# Patient Record
Sex: FEMALE | Race: WHITE | Employment: PART TIME | URBAN - METROPOLITAN AREA
[De-identification: names, ages, dates, MRNs, and addresses within clinical notes are randomized per-mention and may not be internally consistent; named-entity substitution may affect disease eponyms.]

---
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
## 2022-07-03 ENCOUNTER — HOSPITAL ENCOUNTER (OUTPATIENT)
Age: 22
Discharge: HOME | End: 2022-07-03
Attending: OBSTETRICS & GYNECOLOGY | Admitting: OBSTETRICS & GYNECOLOGY
Payer: COMMERCIAL

## 2022-07-03 VITALS
SYSTOLIC BLOOD PRESSURE: 98 MMHG | DIASTOLIC BLOOD PRESSURE: 59 MMHG | RESPIRATION RATE: 16 BRPM | HEIGHT: 65 IN | HEART RATE: 105 BPM | BODY MASS INDEX: 19.66 KG/M2 | TEMPERATURE: 98.2 F | WEIGHT: 118 LBS

## 2022-07-03 PROBLEM — O42.90 AMNIOTIC FLUID LEAKING: Status: ACTIVE | Noted: 2022-07-03

## 2022-07-03 LAB
AMNIOTEST, POC: NORMAL
Lab: NORMAL
NEGATIVE QC PASS/FAIL: NORMAL
POSITIVE QC PASS/FAIL: NORMAL

## 2022-07-03 PROCEDURE — 99211 OFF/OP EST MAY X REQ PHY/QHP: CPT

## 2022-07-03 PROCEDURE — 59025 FETAL NON-STRESS TEST: CPT

## 2022-07-03 NOTE — FLOWSHEET NOTE
Patient came to triage due to feeling like she is leaking amniotic fluid since last night. Denies pain and vaginal bleeding.

## 2022-07-03 NOTE — PROGRESS NOTES
Department of Obstetrics and Gynecology  Labor and Delivery Triage Note        CHIEF COMPLAINT:  Small amount of fluid/wetness since last night. HISTORY OF PRESENT ILLNESS:      The patient is a 25 y.o. female 37w6d. OB History        1    Para        Term                AB        Living           SAB        IAB        Ectopic        Molar        Multiple        Live Births                  Patient presents with a chief complaint as above. Pt denies vaginal bleeding. Estimated Due Date:  Estimated Date of Delivery: 22    PRENATAL CARE:    Complicated by: IUGR    REVIEW OF SYSTEMS:    CONSTITUTIONAL:  negative  EYES:  negative  HEENT:  negative  RESPIRATORY:  negative  CARDIOVASCULAR:  negative  GASTROINTESTINAL:  negative  GENITOURINARY:  negative  INTEGUMENT/BREAST:  negative  MUSCULOSKELETAL:  negative  BEHAVIOR/PSYCH:  negative    PHYSICAL EXAM:    Vitals:    22 1916   BP: (!) 98/59   Pulse: (!) 105   Resp: 16   Temp: 98.2 °F (36.8 °C)   TempSrc: Oral   Weight: 118 lb (53.5 kg)   Height: 5' 5\" (1.651 m)       Physical Examination: General appearance - alert, well appearing, and in no distress    Dilation (cm): Fingertip   Effacement: 0   Cervical Characteristics: Mid-Position   Station: -3        Contraction frequency:  0 minutes    Membranes:  Intact  SSE: nitrazene neg, neg pool  Lab Results   Component Value Date    WBC 7.5 11/15/2021    HGB 12.5 11/15/2021    HCT 37.4 11/15/2021     11/15/2021       ASSESSMENT AND PLAN:  Discharge Dx: The patient is a 25 y.o. female 37w5d. OB History        1    Para        Term                AB        Living           SAB        IAB        Ectopic        Molar        Multiple        Live Births                     Disposition:  Return to office as scheduled.   Medications:      Medication List      STOP taking these medications    ondansetron 4 MG disintegrating tablet  Commonly known as: Zofran ODT        ASK your doctor about these medications    PRENATAL VITAMIN PO           F/U Instructions: as needed

## 2022-07-12 ENCOUNTER — ANESTHESIA EVENT (OUTPATIENT)
Dept: LABOR AND DELIVERY | Age: 22
End: 2022-07-12
Payer: COMMERCIAL

## 2022-07-12 ENCOUNTER — ANESTHESIA (OUTPATIENT)
Dept: LABOR AND DELIVERY | Age: 22
End: 2022-07-12
Payer: COMMERCIAL

## 2022-07-12 ENCOUNTER — ANESTHESIA EVENT (OUTPATIENT)
Dept: LABOR AND DELIVERY | Age: 22
End: 2022-07-12

## 2022-07-12 ENCOUNTER — HOSPITAL ENCOUNTER (INPATIENT)
Age: 22
LOS: 2 days | Discharge: HOME | End: 2022-07-14
Attending: OBSTETRICS & GYNECOLOGY | Admitting: OBSTETRICS & GYNECOLOGY
Payer: COMMERCIAL

## 2022-07-12 ENCOUNTER — ANESTHESIA (OUTPATIENT)
Dept: LABOR AND DELIVERY | Age: 22
End: 2022-07-12

## 2022-07-12 PROBLEM — Z34.90 ENCOUNTER FOR INDUCTION OF LABOR: Status: ACTIVE | Noted: 2022-07-12

## 2022-07-12 LAB
ABO/RH: NORMAL
AMPHETAMINE SCREEN, URINE: NORMAL
ANTIBODY SCREEN: NORMAL
BARBITURATE SCREEN URINE: NORMAL
BASOPHILS ABSOLUTE: 0 K/UL (ref 0–0.2)
BASOPHILS RELATIVE PERCENT: 0.2 %
BENZODIAZEPINE SCREEN, URINE: NORMAL
BUPRENORPHINE URINE: NORMAL
CANNABINOID SCREEN URINE: NORMAL
COCAINE METABOLITE SCREEN URINE: NORMAL
EOSINOPHILS ABSOLUTE: 0.1 K/UL (ref 0–0.6)
EOSINOPHILS RELATIVE PERCENT: 1 %
HCT VFR BLD CALC: 32.6 % (ref 36–48)
HEMOGLOBIN: 11.1 G/DL (ref 12–16)
LYMPHOCYTES ABSOLUTE: 2.5 K/UL (ref 1–5.1)
LYMPHOCYTES RELATIVE PERCENT: 17.9 %
Lab: NORMAL
MCH RBC QN AUTO: 31.8 PG (ref 26–34)
MCHC RBC AUTO-ENTMCNC: 34.1 G/DL (ref 31–36)
MCV RBC AUTO: 93.1 FL (ref 80–100)
METHADONE SCREEN, URINE: NORMAL
MONOCYTES ABSOLUTE: 1 K/UL (ref 0–1.3)
MONOCYTES RELATIVE PERCENT: 7.2 %
NEUTROPHILS ABSOLUTE: 10.4 K/UL (ref 1.7–7.7)
NEUTROPHILS RELATIVE PERCENT: 73.7 %
OPIATE SCREEN URINE: NORMAL
OXYCODONE URINE: NORMAL
PDW BLD-RTO: 13.7 % (ref 12.4–15.4)
PH UA: 5
PHENCYCLIDINE SCREEN URINE: NORMAL
PLATELET # BLD: 186 K/UL (ref 135–450)
PMV BLD AUTO: 10.3 FL (ref 5–10.5)
PROPOXYPHENE SCREEN: NORMAL
RBC # BLD: 3.5 M/UL (ref 4–5.2)
SARS-COV-2, NAAT: NOT DETECTED
WBC # BLD: 14 K/UL (ref 4–11)

## 2022-07-12 PROCEDURE — 6360000002 HC RX W HCPCS: Performed by: OBSTETRICS & GYNECOLOGY

## 2022-07-12 PROCEDURE — 6370000000 HC RX 637 (ALT 250 FOR IP): Performed by: OBSTETRICS & GYNECOLOGY

## 2022-07-12 PROCEDURE — 85025 COMPLETE CBC W/AUTO DIFF WBC: CPT

## 2022-07-12 PROCEDURE — 2500000003 HC RX 250 WO HCPCS: Performed by: NURSE ANESTHETIST, CERTIFIED REGISTERED

## 2022-07-12 PROCEDURE — 2580000003 HC RX 258: Performed by: OBSTETRICS & GYNECOLOGY

## 2022-07-12 PROCEDURE — 86901 BLOOD TYPING SEROLOGIC RH(D): CPT

## 2022-07-12 PROCEDURE — 3700000025 EPIDURAL BLOCK: Performed by: ANESTHESIOLOGY

## 2022-07-12 PROCEDURE — 86850 RBC ANTIBODY SCREEN: CPT

## 2022-07-12 PROCEDURE — 87635 SARS-COV-2 COVID-19 AMP PRB: CPT

## 2022-07-12 PROCEDURE — 1220000000 HC SEMI PRIVATE OB R&B

## 2022-07-12 PROCEDURE — 86900 BLOOD TYPING SEROLOGIC ABO: CPT

## 2022-07-12 PROCEDURE — 86780 TREPONEMA PALLIDUM: CPT

## 2022-07-12 PROCEDURE — 80307 DRUG TEST PRSMV CHEM ANLYZR: CPT

## 2022-07-12 PROCEDURE — 6360000002 HC RX W HCPCS: Performed by: ANESTHESIOLOGY

## 2022-07-12 RX ORDER — LIDOCAINE HYDROCHLORIDE AND EPINEPHRINE 15; 5 MG/ML; UG/ML
INJECTION, SOLUTION EPIDURAL PRN
Status: DISCONTINUED | OUTPATIENT
Start: 2022-07-12 | End: 2022-07-13 | Stop reason: SDUPTHER

## 2022-07-12 RX ORDER — SODIUM CHLORIDE, SODIUM LACTATE, POTASSIUM CHLORIDE, AND CALCIUM CHLORIDE .6; .31; .03; .02 G/100ML; G/100ML; G/100ML; G/100ML
500 INJECTION, SOLUTION INTRAVENOUS PRN
Status: DISCONTINUED | OUTPATIENT
Start: 2022-07-12 | End: 2022-07-13

## 2022-07-12 RX ORDER — SODIUM CHLORIDE 9 MG/ML
25 INJECTION, SOLUTION INTRAVENOUS PRN
Status: DISCONTINUED | OUTPATIENT
Start: 2022-07-12 | End: 2022-07-13

## 2022-07-12 RX ORDER — SODIUM CHLORIDE, SODIUM LACTATE, POTASSIUM CHLORIDE, CALCIUM CHLORIDE 600; 310; 30; 20 MG/100ML; MG/100ML; MG/100ML; MG/100ML
INJECTION, SOLUTION INTRAVENOUS CONTINUOUS
Status: DISCONTINUED | OUTPATIENT
Start: 2022-07-12 | End: 2022-07-13

## 2022-07-12 RX ORDER — NALOXONE HYDROCHLORIDE 0.4 MG/ML
INJECTION, SOLUTION INTRAMUSCULAR; INTRAVENOUS; SUBCUTANEOUS PRN
Status: DISCONTINUED | OUTPATIENT
Start: 2022-07-12 | End: 2022-07-13 | Stop reason: HOSPADM

## 2022-07-12 RX ORDER — SODIUM CHLORIDE 0.9 % (FLUSH) 0.9 %
5-40 SYRINGE (ML) INJECTION EVERY 12 HOURS SCHEDULED
Status: DISCONTINUED | OUTPATIENT
Start: 2022-07-12 | End: 2022-07-13

## 2022-07-12 RX ORDER — SODIUM CHLORIDE 0.9 % (FLUSH) 0.9 %
5-40 SYRINGE (ML) INJECTION PRN
Status: DISCONTINUED | OUTPATIENT
Start: 2022-07-12 | End: 2022-07-13

## 2022-07-12 RX ORDER — ONDANSETRON 2 MG/ML
4 INJECTION INTRAMUSCULAR; INTRAVENOUS EVERY 6 HOURS PRN
Status: DISCONTINUED | OUTPATIENT
Start: 2022-07-12 | End: 2022-07-13

## 2022-07-12 RX ORDER — FENTANYL/BUPIVACAINE/NS/PF 2-1250MCG
PLASTIC BAG, INJECTION (ML) INJECTION
Status: COMPLETED
Start: 2022-07-12 | End: 2022-07-12

## 2022-07-12 RX ORDER — FENTANYL/BUPIVACAINE/NS/PF 2-1250MCG
12 PLASTIC BAG, INJECTION (ML) INJECTION CONTINUOUS
Status: DISCONTINUED | OUTPATIENT
Start: 2022-07-12 | End: 2022-07-13

## 2022-07-12 RX ORDER — SODIUM CHLORIDE, SODIUM LACTATE, POTASSIUM CHLORIDE, AND CALCIUM CHLORIDE .6; .31; .03; .02 G/100ML; G/100ML; G/100ML; G/100ML
1000 INJECTION, SOLUTION INTRAVENOUS PRN
Status: DISCONTINUED | OUTPATIENT
Start: 2022-07-12 | End: 2022-07-13

## 2022-07-12 RX ADMIN — SODIUM CHLORIDE, POTASSIUM CHLORIDE, SODIUM LACTATE AND CALCIUM CHLORIDE 1000 ML: 600; 310; 30; 20 INJECTION, SOLUTION INTRAVENOUS at 23:02

## 2022-07-12 RX ADMIN — Medication 12 ML/HR: at 23:15

## 2022-07-12 RX ADMIN — Medication 25 MCG: at 09:41

## 2022-07-12 RX ADMIN — SODIUM CHLORIDE, POTASSIUM CHLORIDE, SODIUM LACTATE AND CALCIUM CHLORIDE: 600; 310; 30; 20 INJECTION, SOLUTION INTRAVENOUS at 14:54

## 2022-07-12 RX ADMIN — Medication 1 MILLI-UNITS/MIN: at 15:01

## 2022-07-12 RX ADMIN — SODIUM CHLORIDE, POTASSIUM CHLORIDE, SODIUM LACTATE AND CALCIUM CHLORIDE: 600; 310; 30; 20 INJECTION, SOLUTION INTRAVENOUS at 09:36

## 2022-07-12 RX ADMIN — SODIUM CHLORIDE, POTASSIUM CHLORIDE, SODIUM LACTATE AND CALCIUM CHLORIDE: 600; 310; 30; 20 INJECTION, SOLUTION INTRAVENOUS at 23:23

## 2022-07-12 RX ADMIN — LIDOCAINE HYDROCHLORIDE AND EPINEPHRINE 5 ML: 15; 5 INJECTION, SOLUTION EPIDURAL at 23:12

## 2022-07-12 ASSESSMENT — PAIN DESCRIPTION - DESCRIPTORS
DESCRIPTORS: CRAMPING
DESCRIPTORS: DISCOMFORT
DESCRIPTORS: DISCOMFORT
DESCRIPTORS: CRAMPING

## 2022-07-12 NOTE — ANESTHESIA PRE PROCEDURE
Department of Anesthesiology  Preprocedure Note       Name:  Katie Harkins   Age:  25 y.o.  :  2000                                          MRN:  [id number]         Date:  2022      Surgeon: * No surgeons listed *    Procedure: * No procedures listed *    Medications prior to admission:   Prior to Admission medications    Medication Sig Start Date End Date Taking? Authorizing Provider   Prenatal Vit-Fe Fumarate-FA (PRENATAL VITAMIN PO) Take 2 tablets by mouth    Historical Provider, MD       Current medications:    Current Facility-Administered Medications   Medication Dose Route Frequency Provider Last Rate Last Admin    lactated ringers infusion   IntraVENous Continuous Noreen Pettit  mL/hr at 22 0936 New Bag at 22 0936    lactated ringers bolus  500 mL IntraVENous PRN Noreen Pettit MD        Or    lactated ringers bolus  1,000 mL IntraVENous PRN Noreen Pettit MD        sodium chloride flush 0.9 % injection 5-40 mL  5-40 mL IntraVENous 2 times per day Noreen Pettit MD        sodium chloride flush 0.9 % injection 5-40 mL  5-40 mL IntraVENous PRN Noreen Pettit MD        0.9 % sodium chloride infusion  25 mL IntraVENous PRN Noreen Pettit MD        oxytocin (PITOCIN) 30 units in 500 mL infusion  87.3 kamran-units/min IntraVENous Continuous PRN Noreen Pettit MD        And    oxytocin (PITOCIN) 10 unit bolus from the bag  10 Units IntraVENous PRROX Pettit MD        ondansetron [facility]) injection 4 mg  4 mg IntraVENous Q6H PRN Noreen Pettit MD        miSOPROStol (CYTOTEC) pre-split tablet TABS 25 mcg  25 mcg Oral Q4H Noreen Pettit MD   25 mcg at 22 0941       Allergies:  No Known Allergies    Problem List:    Patient Active Problem List   Diagnosis Code    Amniotic fluid leaking O42.90    Encounter for induction of labor Z34.90       Past Medical History:  History reviewed. No pertinent past medical history.     Past Surgical History:  History reviewed. No pertinent surgical history. Social History:    Social History     Tobacco Use    Smoking status: Light Tobacco Smoker    Smokeless tobacco: Never Used   Substance Use Topics    Alcohol use: Never                                Ready to quit: Not Answered  Counseling given: Not Answered      Vital Signs (Current):   Vitals:    07/12/22 0852 07/12/22 0901 07/12/22 0941 07/12/22 0958   BP: (!) 102/58  (!) 104/57 98/60   Pulse: 85  78 88   Resp: 16  16 17   Temp: 36.8 °C (98.2 °F)  36.4 °C (97.5 °F)    TempSrc: Oral  Oral    SpO2:  99%     Weight:       Height:                                                  BP Readings from Last 3 Encounters:   07/12/22 98/60   07/03/22 (!) 98/59   01/13/20 123/78 (89 %, Z = 1.23 /  92 %, Z = 1.41)*     *BP percentiles are based on the 2017 AAP Clinical Practice Guideline for girls       NPO Status:                                                                                 BMI:   Wt Readings from Last 3 Encounters:   07/12/22 121 lb (54.9 kg) (36 %, Z= -0.36)*   07/03/22 118 lb (53.5 kg) (30 %, Z= -0.53)*   01/13/20 115 lb (52.2 kg) (34 %, Z= -0.41)*     * Growth percentiles are based on CDC (Girls, 2-20 Years) data. Body mass index is 20.14 kg/m². CBC:   Lab Results   Component Value Date/Time    WBC 14.0 07/12/2022 09:25 AM    RBC 3.50 07/12/2022 09:25 AM    HGB 11.1 07/12/2022 09:25 AM    HCT 32.6 07/12/2022 09:25 AM    MCV 93.1 07/12/2022 09:25 AM    RDW 13.7 07/12/2022 09:25 AM     07/12/2022 09:25 AM       CMP: No results found for: NA, K, CL, CO2, BUN, CREATININE, GFRAA, AGRATIO, LABGLOM, GLUCOSE, GLU, PROT, CALCIUM, BILITOT, ALKPHOS, AST, ALT    POC Tests: No results for input(s): POCGLU, POCNA, POCK, POCCL, POCBUN, POCHEMO, POCHCT in the last 72 hours.     Coags: No results found for: PROTIME, INR, APTT    HCG (If Applicable): No results found for: PREGTESTUR, PREGSERUM, HCG, HCGQUANT     ABGs: No results found for: PHART, PO2ART, RRV5OOS, RTM2LDE, BEART, K0DCQMTD     Type & Screen (If Applicable):  No results found for: LABABO, LABRH    Drug/Infectious Status (If Applicable):  No results found for: HIV, HEPCAB    COVID-19 Screening (If Applicable): No results found for: COVID19        Anesthesia Evaluation  Patient summary reviewed and Nursing notes reviewed  Airway: Mallampati: I  TM distance: >3 FB   Neck ROM: full  Mouth opening: > = 3 FB   Dental: normal exam         Pulmonary:normal exam  breath sounds clear to auscultation                             Cardiovascular:Negative CV ROS  Exercise tolerance: good (>4 METS),         NYHA Classification: I    Rhythm: regular  Rate: normal           Beta Blocker:  Not on Beta Blocker         Neuro/Psych:   Negative Neuro/Psych ROS              GI/Hepatic/Renal: Neg GI/Hepatic/Renal ROS            Endo/Other: Negative Endo/Other ROS                    Abdominal:             Vascular: negative vascular ROS. Other Findings:           Anesthesia Plan      epidural     ASA 2             Anesthetic plan and risks discussed with patient. Use of blood products discussed with patient whom consented to blood products. Plan discussed with attending. Patient request pain control for labor and delivery. Discussed procedure (epidural,spinal, general and non regional options) and  options. Alternatives, benefits, risks, including but not limited to changes in VSS, allergic reaction, infection, intravascular injection, paresthesia, n/v, severe headache, temporary or permanent rare neurologic sequelae, and failed block. All questions answered and states understanding. Patient agrees to proceed. Choice of anesthetic will be determined by clinical condition at the time of anesthesia initiation.         CLARENCE Márquez - CRNA   2022

## 2022-07-13 LAB — TOTAL SYPHILLIS IGG/IGM: NORMAL

## 2022-07-13 PROCEDURE — 0UQMXZZ REPAIR VULVA, EXTERNAL APPROACH: ICD-10-PCS | Performed by: OBSTETRICS & GYNECOLOGY

## 2022-07-13 PROCEDURE — 6360000002 HC RX W HCPCS: Performed by: OBSTETRICS & GYNECOLOGY

## 2022-07-13 PROCEDURE — 6370000000 HC RX 637 (ALT 250 FOR IP): Performed by: OBSTETRICS & GYNECOLOGY

## 2022-07-13 PROCEDURE — 3E0P7VZ INTRODUCTION OF HORMONE INTO FEMALE REPRODUCTIVE, VIA NATURAL OR ARTIFICIAL OPENING: ICD-10-PCS | Performed by: OBSTETRICS & GYNECOLOGY

## 2022-07-13 PROCEDURE — 3E033VJ INTRODUCTION OF OTHER HORMONE INTO PERIPHERAL VEIN, PERCUTANEOUS APPROACH: ICD-10-PCS | Performed by: OBSTETRICS & GYNECOLOGY

## 2022-07-13 PROCEDURE — 10907ZC DRAINAGE OF AMNIOTIC FLUID, THERAPEUTIC FROM PRODUCTS OF CONCEPTION, VIA NATURAL OR ARTIFICIAL OPENING: ICD-10-PCS | Performed by: OBSTETRICS & GYNECOLOGY

## 2022-07-13 PROCEDURE — 1220000000 HC SEMI PRIVATE OB R&B

## 2022-07-13 PROCEDURE — 6360000002 HC RX W HCPCS: Performed by: ANESTHESIOLOGY

## 2022-07-13 PROCEDURE — 7200000001 HC VAGINAL DELIVERY

## 2022-07-13 RX ORDER — OXYCODONE HYDROCHLORIDE 5 MG/1
5 TABLET ORAL EVERY 6 HOURS PRN
Qty: 10 TABLET | Refills: 0 | Status: SHIPPED | OUTPATIENT
Start: 2022-07-13 | End: 2022-07-18

## 2022-07-13 RX ORDER — OXYCODONE HYDROCHLORIDE 5 MG/1
5 TABLET ORAL EVERY 4 HOURS PRN
Status: DISCONTINUED | OUTPATIENT
Start: 2022-07-13 | End: 2022-07-14 | Stop reason: HOSPADM

## 2022-07-13 RX ORDER — SODIUM CHLORIDE 9 MG/ML
INJECTION, SOLUTION INTRAVENOUS PRN
Status: DISCONTINUED | OUTPATIENT
Start: 2022-07-13 | End: 2022-07-14 | Stop reason: HOSPADM

## 2022-07-13 RX ORDER — MODIFIED LANOLIN
OINTMENT (GRAM) TOPICAL PRN
Status: DISCONTINUED | OUTPATIENT
Start: 2022-07-13 | End: 2022-07-14 | Stop reason: HOSPADM

## 2022-07-13 RX ORDER — IBUPROFEN 600 MG/1
600 TABLET ORAL EVERY 8 HOURS PRN
Qty: 30 TABLET | Refills: 1 | Status: SHIPPED | OUTPATIENT
Start: 2022-07-13

## 2022-07-13 RX ORDER — ACETAMINOPHEN 500 MG
1000 TABLET ORAL EVERY 6 HOURS PRN
Qty: 30 TABLET | Refills: 0 | Status: SHIPPED | OUTPATIENT
Start: 2022-07-13

## 2022-07-13 RX ORDER — IBUPROFEN 400 MG/1
400 TABLET ORAL EVERY 6 HOURS PRN
Status: DISCONTINUED | OUTPATIENT
Start: 2022-07-13 | End: 2022-07-14 | Stop reason: HOSPADM

## 2022-07-13 RX ORDER — SODIUM CHLORIDE 0.9 % (FLUSH) 0.9 %
5-40 SYRINGE (ML) INJECTION PRN
Status: DISCONTINUED | OUTPATIENT
Start: 2022-07-13 | End: 2022-07-14 | Stop reason: HOSPADM

## 2022-07-13 RX ORDER — SODIUM CHLORIDE 0.9 % (FLUSH) 0.9 %
5-40 SYRINGE (ML) INJECTION EVERY 12 HOURS SCHEDULED
Status: DISCONTINUED | OUTPATIENT
Start: 2022-07-13 | End: 2022-07-14 | Stop reason: HOSPADM

## 2022-07-13 RX ORDER — OXYCODONE HYDROCHLORIDE 5 MG/1
10 TABLET ORAL EVERY 4 HOURS PRN
Status: DISCONTINUED | OUTPATIENT
Start: 2022-07-13 | End: 2022-07-14 | Stop reason: HOSPADM

## 2022-07-13 RX ORDER — ACETAMINOPHEN 500 MG
1000 TABLET ORAL EVERY 8 HOURS
Status: DISCONTINUED | OUTPATIENT
Start: 2022-07-13 | End: 2022-07-14 | Stop reason: HOSPADM

## 2022-07-13 RX ORDER — DOCUSATE SODIUM 100 MG/1
100 CAPSULE, LIQUID FILLED ORAL 2 TIMES DAILY
Status: DISCONTINUED | OUTPATIENT
Start: 2022-07-13 | End: 2022-07-14 | Stop reason: HOSPADM

## 2022-07-13 RX ADMIN — ACETAMINOPHEN 1000 MG: 500 TABLET ORAL at 13:52

## 2022-07-13 RX ADMIN — DOCUSATE SODIUM 100 MG: 100 CAPSULE, LIQUID FILLED ORAL at 20:06

## 2022-07-13 RX ADMIN — ACETAMINOPHEN 1000 MG: 500 TABLET ORAL at 21:51

## 2022-07-13 RX ADMIN — DOCUSATE SODIUM 100 MG: 100 CAPSULE, LIQUID FILLED ORAL at 08:51

## 2022-07-13 RX ADMIN — Medication 87.3 MILLI-UNITS/MIN: at 03:00

## 2022-07-13 RX ADMIN — Medication 10 ML: at 02:57

## 2022-07-13 RX ADMIN — IBUPROFEN 400 MG: 400 TABLET, FILM COATED ORAL at 08:47

## 2022-07-13 RX ADMIN — Medication 166.7 ML: at 03:01

## 2022-07-13 ASSESSMENT — PAIN DESCRIPTION - LOCATION: LOCATION: PERINEUM

## 2022-07-13 ASSESSMENT — PAIN SCALES - GENERAL
PAINLEVEL_OUTOF10: 3
PAINLEVEL_OUTOF10: 2
PAINLEVEL_OUTOF10: 0

## 2022-07-13 NOTE — DISCHARGE SUMMARY
Obstetrical Discharge Form    Gestational Age: 36w3d    Antepartum complications: intrauterine growth restriction    Date of Delivery: 22      Type of Delivery: vaginal, spontaneous    Delivered By: Lucy Seth     Baby:      Information for the patient's :  Chavez Alcocer [[id number]]   APGAR One: 9     Information for the patient's :  Olive Liner [[id number]]   APGAR Five: 9     Information for the patient's :  Olive Liner [[id number]]   Birth Weight: 5 lb 14.2 oz (2.67 kg)       Anesthesia: Epidural    Intrapartum complications: None    Postpartum complications: none    Discharge Medication:      Medication List      ASK your doctor about these medications    PRENATAL VITAMIN PO            Discharge Condition:  good    Discharge Date: 22    PLAN:  Follow up in 6 weeks for routine PP visit  All questions answered  D/C summary begun at delivery for D/C planning purposes, any delay in discharge from ordered D/C date due to  factors.

## 2022-07-13 NOTE — ANESTHESIA POSTPROCEDURE EVALUATION
Department of Anesthesiology  Postprocedure Note    Patient: Geronimo Zayas  MRN: [id number]  YOB: 2000  Date of evaluation: 7/13/2022      Procedure Summary     Date: 07/12/22 Room / Location:     Anesthesia Start: 2252 Anesthesia Stop: 07/13/22 0257    Procedure: Labor Analgesia Diagnosis:     Scheduled Providers:  Responsible Provider: Lucero Ruff MD    Anesthesia Type: epidural ASA Status: 2          Anesthesia Type: No value filed. Amie Phase I: Amie Score: 10    Amie Phase II: Amie Score: 10      Anesthesia Post Evaluation    Patient location during evaluation: floor  Patient participation: complete - patient participated  Level of consciousness: awake and alert  Pain score: 0  Airway patency: patent  Nausea & Vomiting: no vomiting and no nausea  Complications: no  Cardiovascular status: hemodynamically stable  Respiratory status: acceptable  Hydration status: stable  Comments: Patient s/p epidural for L&D. Pt denies residual numbness post block. Patient is ambulating and voiding without difficulty. Patient denies back pain, headache, paresthesias, n/v or pruritus. Epidural site is free of signs of infection.

## 2022-07-13 NOTE — PROGRESS NOTES
0.0 07/12/2022 09:25 AM     WBC:    Lab Results   Component Value Date/Time    WBC 14.0 07/12/2022 09:25 AM     Platelets:    Lab Results   Component Value Date/Time     07/12/2022 09:25 AM     Hemoglobin/Hematocrit:    Lab Results   Component Value Date/Time    HGB 11.1 07/12/2022 09:25 AM    HCT 32.6 07/12/2022 09:25 AM         Assessment:     Status post vaginal delivery. The patient is doing well, although delivery was complicated by a vaginal laceration repaired with stitching. Plan:     Continue current care. The patient can continue Nsaids to control any cramping at this point, and should watch for any changes in bleeding or worsening vaginal pain. She should make an appointment to f/u in 4-6 weeks as directed.

## 2022-07-13 NOTE — L&D DELIVERY SUMMARY NOTE
Labor & Delivery Summary  Labor Onset Date: 22  Labor Onset Time: 2315  Dilation Complete Date: 22  Dilation Complete Time: 0155  OB Anesthesia Type: Epidural  Induction: Prostaglandins  Augmentation: AROM    Pre-operative Diagnosis:  Term pregnancy and Induced labor    Post-operative Diagnosis:  Living  infant,  Male    Procedure:  Spontaneous vaginal delivery or Repair right labial spontaneous laceration    Surgeon:   Dr. Paula Kramer for the patient's :  [place] Brenner [[id number]]          Anesthesia:  epidural anesthesia    Estimated blood loss:  300ml    Specimen:  Placenta not sent to pathology     Cord blood sent No    Complications:  none    Condition:  infant stable to general nursery    Details of Procedure: The patient is a 25 y.o. female at 36w3d   OB History        1    Para   1    Term   1            AB        Living   1       SAB        IAB        Ectopic        Molar        Multiple   0    Live Births   1             who was admitted for induction. She received the following interventions: Cytotec x1, ARBOW and IV Pitocin induction The patient progressed well,did receive an epidural, became complete and started to push. After pushing for 10 min  the fetal head was at the perineum, nose and mouth suctioned with bulb suction and the rest of the infant delivered atraumatically, placed on mother abdomen. Cord was clamped and cut and infant handed off to the waiting nurse for evaluation. The placenta was delivered byspontaneous. The perineum and vagina were explored and right labial laceration was repaired in standard fashion.

## 2022-07-13 NOTE — ANESTHESIA PROCEDURE NOTES
Epidural Block    Patient location during procedure: OB  Start time: 2022 10:52 PM  End time: 2022 11:12 PM  Reason for block: labor epidural  Staffing  Performed: resident/CRNA   Anesthesiologist: Pranav Holland MD  Resident/CRNA: CLARENCE Musa - CRNA  Epidural  Patient position: sitting  Prep: ChloraPrep and site prepped and draped  Patient monitoring: continuous pulse ox and frequent blood pressure checks  Approach: midline  Location: L3-4  Injection technique: CARSON saline  Provider prep: mask and sterile gloves  Needle  Needle type: Tuohy   Needle gauge: 17 G  Needle length: 3.5 in  Needle insertion depth: 7 cm  Catheter type: multi-orifice  Catheter size: 19 G  Catheter at skin depth: 12 cm  Test dose: negativeCatheter Secured: tegaderm and tape  Assessment  Sensory level: T10  Hemodynamics: stable  Attempts: 1  Outcomes: uncomplicated and patient tolerated procedure well  Additional Notes  Procedure(labor epidural):    Called at 2252 for labor epidural analgesia request. Patient identified, informed consent obtained, and timeout performed. Medical and Surgical history reviewed with pt. Risks/benefits/alternatives of epidural discussed including allergic reaction, infection, bleeding, hypotension, headache, back pain, nerve damage, failed or one-sided block. Also discussed anesthesia options and associated risks in the event of . All questions answered. Verbalizes understanding and requests to proceed. VSS:  Stable throughout      Pt in sitting position. Labor epidural placed using CARSON sterile technique (donned mask, hat, and sterile gloves). Back prepped with Chloraprep x 2. Sterile drape applied. Site: L3-4  CARSON:  7cm. Attempts:  1  Re-directs: 0  Site infiltrated with 3ml 1%Lidocaine(25g). 17G Tuohy needle inserted, CARSON technique with saline. Epidural space dilated with saline. Threaded spring wound epidural catheter through Tuohy needle easily.  No heme, CSF, pain with injection, or paresthesias noted. Tuohy needle withdrawn. Test Dose: 2312 Negative aspiration or pain with injection. 5ml of 1.5% Lido with epi 1:200,000 test dose given. Negative test dose. Skin:  12cm catheter taped at the skin. Secured with steri strips, tegaderm, and tape. Bolus Dose: 10ml of 0.125% bupivacaine with Fentanyl (2ug/ml). Negative aspiration or pain with injection. Given in 2ml increments. Infusion: 0.125% bupivacaine with Fentanyl (2ug/ml)  Auto bolus 4ml every 20 minutes. (Max. Dose- 40 ml/hr.)      Sensory Level:  R:  T10  L:  T10    Motor: 4/5  VAS: start 8/10, end 0/10. Stated comfort and acceptable to patient. Patient in supine/HOB position with left uterine displacement. VSS. Procedure un complicated and patient tolerated it without complaints.    Preanesthetic Checklist  Completed: patient identified, IV checked, site marked, risks and benefits discussed, surgical/procedural consents, equipment checked, pre-op evaluation, timeout performed, anesthesia consent given, oxygen available, monitors applied/VS acknowledged, fire risk safety assessment completed and verbalized and blood product R/B/A discussed and consented

## 2022-07-14 VITALS
HEART RATE: 79 BPM | SYSTOLIC BLOOD PRESSURE: 101 MMHG | DIASTOLIC BLOOD PRESSURE: 62 MMHG | OXYGEN SATURATION: 98 % | BODY MASS INDEX: 20.16 KG/M2 | TEMPERATURE: 97.9 F | WEIGHT: 121 LBS | HEIGHT: 65 IN | RESPIRATION RATE: 16 BRPM

## 2022-07-14 PROCEDURE — 6370000000 HC RX 637 (ALT 250 FOR IP): Performed by: OBSTETRICS & GYNECOLOGY

## 2022-07-14 RX ADMIN — ACETAMINOPHEN 1000 MG: 500 TABLET ORAL at 06:04

## 2022-07-14 RX ADMIN — IBUPROFEN 400 MG: 400 TABLET, FILM COATED ORAL at 09:53

## 2022-07-14 RX ADMIN — DOCUSATE SODIUM 100 MG: 100 CAPSULE, LIQUID FILLED ORAL at 09:54

## 2022-07-14 RX ADMIN — ACETAMINOPHEN 1000 MG: 500 TABLET ORAL at 14:22

## 2022-07-14 ASSESSMENT — PAIN DESCRIPTION - LOCATION
LOCATION: PERINEUM
LOCATION: PERINEUM

## 2022-07-14 ASSESSMENT — PAIN DESCRIPTION - DESCRIPTORS
DESCRIPTORS: DISCOMFORT
DESCRIPTORS: DISCOMFORT

## 2022-07-14 ASSESSMENT — PAIN SCALES - GENERAL
PAINLEVEL_OUTOF10: 0
PAINLEVEL_OUTOF10: 3
PAINLEVEL_OUTOF10: 4

## 2022-07-14 NOTE — PROGRESS NOTES
Subjective:     Postpartum Day 2: Vaginal Delivery     The patient feels well. Pain is well controlled with current medications. The baby is well. Baby is feeding via breast and the baby is latching on well. Urinary output is adequate with no changes in urination. The patient is ambulating well. The patient is tolerating a normal diet. Flatus has been passed. The patient notes mild bleeding and vaginal pain that is improved from yesterday. She had a vaginal laceration during delivery. The patient denies new onset headaches, vision changes, hearing changes, CP, SOB, heart palpitations, n/v/d, changes in urination, and swelling.     Objective:      Patient Vitals for the past 8 hrs:   BP Temp Temp src Pulse   7/14/2022 0130 115/71 98.3 °F (36.8 °C) Oral 89     General:    alert, appears stated age and cooperative   Cardiovascular RRR   Pulmonary CTAB   Uterine Fundus:   firm   Skin No lower extremity edema   DVT Evaluation:  No evidence of DVT seen on physical exam.       CBC:   Lab Results   Component Value Date/Time    WBC 14.0 07/12/2022 09:25 AM    RBC 3.50 07/12/2022 09:25 AM    HGB 11.1 07/12/2022 09:25 AM    HCT 32.6 07/12/2022 09:25 AM    MCV 93.1 07/12/2022 09:25 AM    MCH 31.8 07/12/2022 09:25 AM    MCHC 34.1 07/12/2022 09:25 AM    RDW 13.7 07/12/2022 09:25 AM     07/12/2022 09:25 AM    MPV 10.3 07/12/2022 09:25 AM     CBC with Differential:    Lab Results   Component Value Date/Time    WBC 14.0 07/12/2022 09:25 AM    RBC 3.50 07/12/2022 09:25 AM    HGB 11.1 07/12/2022 09:25 AM    HCT 32.6 07/12/2022 09:25 AM     07/12/2022 09:25 AM    MCV 93.1 07/12/2022 09:25 AM    MCH 31.8 07/12/2022 09:25 AM    MCHC 34.1 07/12/2022 09:25 AM    RDW 13.7 07/12/2022 09:25 AM    LYMPHOPCT 17.9 07/12/2022 09:25 AM    MONOPCT 7.2 07/12/2022 09:25 AM    BASOPCT 0.2 07/12/2022 09:25 AM    MONOSABS 1.0 07/12/2022 09:25 AM    LYMPHSABS 2.5 07/12/2022 09:25 AM    EOSABS 0.1 07/12/2022 09:25 AM    BASOSABS 0.0 07/12/2022 09:25 AM     WBC:    Lab Results   Component Value Date/Time    WBC 14.0 07/12/2022 09:25 AM     Platelets:    Lab Results   Component Value Date/Time     07/12/2022 09:25 AM     Hemoglobin/Hematocrit:    Lab Results   Component Value Date/Time    HGB 11.1 07/12/2022 09:25 AM    HCT 32.6 07/12/2022 09:25 AM         Assessment:     Status post vaginal delivery. The patient is doing well, although delivery was complicated by a vaginal laceration repaired with stitching. Plan:     Continue current care. The patient can continue Nsaids to control any cramping at this point, and should watch for any changes in bleeding or worsening vaginal pain. She should make an appointment to f/u in 4-6 weeks as directed.

## 2023-12-28 ENCOUNTER — OFFICE VISIT (OUTPATIENT)
Age: 23
End: 2023-12-28

## 2023-12-28 VITALS
WEIGHT: 94 LBS | TEMPERATURE: 102.1 F | HEART RATE: 129 BPM | BODY MASS INDEX: 15.66 KG/M2 | DIASTOLIC BLOOD PRESSURE: 73 MMHG | RESPIRATION RATE: 18 BRPM | OXYGEN SATURATION: 98 % | SYSTOLIC BLOOD PRESSURE: 108 MMHG | HEIGHT: 65 IN

## 2023-12-28 DIAGNOSIS — N30.90 CYSTITIS: ICD-10-CM

## 2023-12-28 DIAGNOSIS — R35.0 FREQUENCY OF URINATION: Primary | ICD-10-CM

## 2023-12-28 LAB
BILIRUBIN, POC: ABNORMAL
BLOOD URINE, POC: ABNORMAL
CLARITY, POC: ABNORMAL
COLOR, POC: ABNORMAL
CONTROL: NORMAL
GLUCOSE URINE, POC: NEGATIVE
KETONES, POC: NEGATIVE
LEUKOCYTE EST, POC: ABNORMAL
NITRITE, POC: NEGATIVE
PH, POC: 6
PREGNANCY TEST URINE, POC: NEGATIVE
PROTEIN, POC: ABNORMAL
SPECIFIC GRAVITY, POC: 1.03
UROBILINOGEN, POC: 1

## 2023-12-28 RX ORDER — NITROFURANTOIN 25; 75 MG/1; MG/1
100 CAPSULE ORAL 2 TIMES DAILY
Qty: 10 CAPSULE | Refills: 0 | Status: SHIPPED | OUTPATIENT
Start: 2023-12-28 | End: 2024-01-02

## 2023-12-28 RX ORDER — PHENAZOPYRIDINE HYDROCHLORIDE 200 MG/1
200 TABLET, FILM COATED ORAL 3 TIMES DAILY PRN
Qty: 9 TABLET | Refills: 0 | Status: SHIPPED | OUTPATIENT
Start: 2023-12-28 | End: 2023-12-31

## 2023-12-31 LAB
BACTERIA UR CULT: ABNORMAL
ORGANISM: ABNORMAL

## 2025-03-25 LAB
GLUCOSE CHALLENGE: 73 MG/DL (ref 70–129)
HCT VFR BLD CALC: 39.3 % (ref 34–45)
HEMOGLOBIN: 13.1 G/DL (ref 11.2–15.7)
MCH RBC QN AUTO: 32 PG (ref 26–34)
MCHC RBC AUTO-ENTMCNC: 33.3 G/DL (ref 30.7–35.5)
MCV RBC AUTO: 95.9 FL (ref 80–100)
PDW BLD-RTO: 13.8 %
PLATELET # BLD: 209 X10(3)/MCL (ref 155–369)
PMV BLD AUTO: 11.4 FL (ref 8.8–12.5)
RBC # BLD: 4.1 X10(6)/MCL (ref 3.9–5.2)
T PALLIDUM AB: 0.04 INDEX VALUE
WBC # BLD: 10 X10(3)/MCL (ref 3.7–10.3)

## 2025-04-15 NOTE — H&P
"Virtual Visit Details    Type of service:  Video Visit     Originating Location (pt. Location): {video visit patient location:124930::\"Home\"}  {PROVIDER LOCATION On-site should be selected for visits conducted from your clinic location or adjoining [facility] hospital, academic office, or other nearby [facility] building. Off-site should be selected for all other provider locations, including home:[id number]}  Distant Location (provider location):  {virtual location provider:935626}  Platform used for Video Visit: {Virtual Visit Platforms:698519::\"[employer] [place]\"}  " Department of Obstetrics and Gynecology   Obstetrics History and Physical        CHIEF COMPLAINT:  IUGR    HISTORY OF PRESENT ILLNESS:      The patient is a 25 y.o. female at 39w0d. OB History        1    Para        Term                AB        Living           SAB        IAB        Ectopic        Molar        Multiple        Live Births                Patient presents with a chief complaint as above and is being admitted for induction    Estimated Due Date: Estimated Date of Delivery: 22    PRENATAL CARE:    Complicated by: IUGR, low BMI    PAST OB HISTORY:  OB History        1    Para        Term                AB        Living           SAB        IAB        Ectopic        Molar        Multiple        Live Births                    Past Medical History:    History reviewed. No pertinent past medical history. Past Surgical History:    History reviewed. No pertinent surgical history. Allergies:  Patient has no known allergies. Social History:    Social History     Socioeconomic History    Marital status: Single     Spouse name: Not on file    Number of children: Not on file    Years of education: Not on file    Highest education level: Not on file   Occupational History    Not on file   Tobacco Use    Smoking status: Light Tobacco Smoker    Smokeless tobacco: Never Used   Vaping Use    Vaping Use: Some days    Substances: Nicotine   Substance and Sexual Activity    Alcohol use: Never    Drug use: Never    Sexual activity: Yes     Partners: Male   Other Topics Concern    Not on file   Social History Narrative    Not on file     Social Determinants of Health     Financial Resource Strain:     Difficulty of Paying Living Expenses: Not on file   Food Insecurity:     Worried About Running Out of Food in the Last Year: Not on file    Gilles of Food in the Last Year: Not on file   Transportation Needs:     Lack of Transportation (Medical):  Not on file    Lack of Transportation (Non-Medical): Not on file   Physical Activity:     Days of Exercise per Week: Not on file    Minutes of Exercise per Session: Not on file   Stress:     Feeling of Stress : Not on file   Social Connections:     Frequency of Communication with Friends and Family: Not on file    Frequency of Social Gatherings with Friends and Family: Not on file    Attends Sikhism Services: Not on file    Active Member of [address] [place] [address] or Organizations: Not on file    Attends Club or Organization Meetings: Not on file    Marital Status: Not on file   Intimate Partner Violence:     Fear of Current or Ex-Partner: Not on file    Emotionally Abused: Not on file    Physically Abused: Not on file    Sexually Abused: Not on file   Housing Stability:     Unable to Pay for Housing in the Last Year: Not on file    Number of [place] in the Last Year: Not on file    Unstable Housing in the Last Year: Not on file     Family History:       Problem Relation Age of Onset    Cancer Mother         thyroid    Cancer Paternal Grandmother         breast, colon and ovarian cancer     Medications Prior to Admission:  Medications Prior to Admission: Prenatal Vit-Fe Fumarate-FA (PRENATAL VITAMIN PO), Take 2 tablets by mouth    REVIEW OF SYSTEMS:    negative    PHYSICAL EXAM:  Vitals:    07/12/22 0841 07/12/22 0852 07/12/22 0901   BP:  (!) 102/58    Pulse:  85    Resp:  16    Temp:  98.2 °F (36.8 °C)    TempSrc:  Oral    SpO2:   99%   Weight: 121 lb (54.9 kg)     Height: 5' 5\" (1.651 m)       General appearance:  awake, alert, cooperative, no apparent distress, and appears stated age  Neurologic:  Awake, alert, oriented to name, place and time. Lungs:  No increased work of breathing, good air exchange  Abdomen:  Soft, non tender, gravid, consistent with her gestational age, EFW by Leopold's maneuver was 5 1/2 lb   Fetal heart rate:  Reassuring.   Pelvis:  Adequate pelvis  Cervix: 1/50  Contraction frequency: irregular Membranes:  Intact    Labs:   CBC:   Lab Results   Component Value Date/Time    WBC 7.5 11/15/2021 02:08 PM    RBC 4.18 11/15/2021 02:08 PM    HGB 12.5 11/15/2021 02:08 PM    HCT 37.4 11/15/2021 02:08 PM    MCV 89.4 11/15/2021 02:08 PM    MCH 29.9 11/15/2021 02:08 PM    MCHC 33.4 11/15/2021 02:08 PM    RDW 13.6 11/15/2021 02:08 PM     11/15/2021 02:08 PM    MPV 10.4 11/15/2021 02:08 PM       ASSESSMENT AND PLAN:    IUGR induction: Admit, anticipate normal delivery, routine labor orders  Fetus: Reassuring  GBS: No  Other: plan cytotec for cervical ripening and labor induction